# Patient Record
Sex: MALE | Race: WHITE | URBAN - NONMETROPOLITAN AREA
[De-identification: names, ages, dates, MRNs, and addresses within clinical notes are randomized per-mention and may not be internally consistent; named-entity substitution may affect disease eponyms.]

---

## 2020-03-13 ENCOUNTER — OFFICE VISIT (OUTPATIENT)
Dept: FAMILY MEDICINE CLINIC | Age: 25
End: 2020-03-13
Payer: COMMERCIAL

## 2020-03-13 VITALS
DIASTOLIC BLOOD PRESSURE: 60 MMHG | BODY MASS INDEX: 27.5 KG/M2 | WEIGHT: 203 LBS | HEIGHT: 72 IN | SYSTOLIC BLOOD PRESSURE: 120 MMHG | TEMPERATURE: 98.7 F | HEART RATE: 90 BPM | OXYGEN SATURATION: 97 %

## 2020-03-13 PROCEDURE — 99213 OFFICE O/P EST LOW 20 MIN: CPT | Performed by: PHYSICIAN ASSISTANT

## 2020-03-13 ASSESSMENT — ENCOUNTER SYMPTOMS
ALLERGIC/IMMUNOLOGIC NEGATIVE: 1
GASTROINTESTINAL NEGATIVE: 1
EYES NEGATIVE: 1
RESPIRATORY NEGATIVE: 1

## 2020-03-13 NOTE — PROGRESS NOTES
oropharyngeal erythema. Eyes:      Conjunctiva/sclera: Conjunctivae normal.      Pupils: Pupils are equal, round, and reactive to light. Neck:      Musculoskeletal: Normal range of motion and neck supple. No muscular tenderness. Cardiovascular:      Rate and Rhythm: Normal rate and regular rhythm. Pulses: Normal pulses. Heart sounds: Normal heart sounds. Pulmonary:      Effort: Pulmonary effort is normal. No respiratory distress. Breath sounds: Normal breath sounds. No stridor. No wheezing, rhonchi or rales. Chest:      Chest wall: No tenderness. Lymphadenopathy:      Cervical: No cervical adenopathy. Neurological:      Mental Status: He is alert. Assessment/Plan:  There are no diagnoses linked to this encounter. Pt. to follow up if PCP if no better 1 week.      Tessa Noonan PA-C

## 2021-10-29 ENCOUNTER — OFFICE VISIT (OUTPATIENT)
Dept: FAMILY MEDICINE CLINIC | Age: 26
End: 2021-10-29
Payer: COMMERCIAL

## 2021-10-29 VITALS
WEIGHT: 226 LBS | HEART RATE: 90 BPM | BODY MASS INDEX: 30.61 KG/M2 | RESPIRATION RATE: 16 BRPM | HEIGHT: 72 IN | OXYGEN SATURATION: 98 % | SYSTOLIC BLOOD PRESSURE: 122 MMHG | TEMPERATURE: 97.9 F | DIASTOLIC BLOOD PRESSURE: 76 MMHG

## 2021-10-29 DIAGNOSIS — R11.2 NON-INTRACTABLE VOMITING WITH NAUSEA, UNSPECIFIED VOMITING TYPE: ICD-10-CM

## 2021-10-29 DIAGNOSIS — R19.7 DIARRHEA, UNSPECIFIED TYPE: Primary | ICD-10-CM

## 2021-10-29 LAB
Lab: NORMAL
PERFORMING INSTRUMENT: NORMAL
QC PASS/FAIL: NORMAL
SARS-COV-2, POC: NORMAL

## 2021-10-29 PROCEDURE — G8427 DOCREV CUR MEDS BY ELIG CLIN: HCPCS | Performed by: NURSE PRACTITIONER

## 2021-10-29 PROCEDURE — G8484 FLU IMMUNIZE NO ADMIN: HCPCS | Performed by: NURSE PRACTITIONER

## 2021-10-29 PROCEDURE — G8417 CALC BMI ABV UP PARAM F/U: HCPCS | Performed by: NURSE PRACTITIONER

## 2021-10-29 PROCEDURE — 99214 OFFICE O/P EST MOD 30 MIN: CPT | Performed by: NURSE PRACTITIONER

## 2021-10-29 PROCEDURE — 1036F TOBACCO NON-USER: CPT | Performed by: NURSE PRACTITIONER

## 2021-10-29 PROCEDURE — 87426 SARSCOV CORONAVIRUS AG IA: CPT | Performed by: NURSE PRACTITIONER

## 2021-10-29 RX ORDER — GREEN TEA/HOODIA GORDONII 315-12.5MG
1 CAPSULE ORAL DAILY
Qty: 30 TABLET | Refills: 0 | Status: SHIPPED | OUTPATIENT
Start: 2021-10-29 | End: 2021-11-28

## 2021-10-29 RX ORDER — ONDANSETRON 4 MG/1
4 TABLET, ORALLY DISINTEGRATING ORAL EVERY 8 HOURS PRN
Qty: 20 TABLET | Refills: 0 | Status: SHIPPED
Start: 2021-10-29 | End: 2021-12-30 | Stop reason: ALTCHOICE

## 2021-10-29 NOTE — PATIENT INSTRUCTIONS
Patient Education        Diarrhea: Care Instructions  Your Care Instructions     Diarrhea is loose, watery stools (bowel movements). The exact cause is often hard to find. Sometimes diarrhea is your body's way of getting rid of what caused an upset stomach. Viruses, food poisoning, and many medicines can cause diarrhea. Some people get diarrhea in response to emotional stress, anxiety, or certain foods. Almost everyone has diarrhea now and then. It usually isn't serious, and your stools will return to normal soon. The important thing to do is replace the fluids you have lost, so you can prevent dehydration. The doctor has checked you carefully, but problems can develop later. If you notice any problems or new symptoms, get medical treatment right away. Follow-up care is a key part of your treatment and safety. Be sure to make and go to all appointments, and call your doctor if you are having problems. It's also a good idea to know your test results and keep a list of the medicines you take. How can you care for yourself at home? · Watch for signs of dehydration, which means your body has lost too much water. Dehydration is a serious condition and should be treated right away. Signs of dehydration are:  ? Increasing thirst and dry eyes and mouth. ? Feeling faint or lightheaded. ? A smaller amount of urine than normal.  · To prevent dehydration, drink plenty of fluids. Choose water and other clear liquids until you feel better. If you have kidney, heart, or liver disease and have to limit fluids, talk with your doctor before you increase the amount of fluids you drink. · When you feel like eating, start with small amounts of food. · The doctor may recommend that you take over-the-counter medicine, such as loperamide (Imodium), if you still have diarrhea after 6 hours. Read and follow all instructions on the label.  Do not use this medicine if you have bloody diarrhea, a high fever, or other signs of serious illness. Call your doctor if you think you are having a problem with your medicine. When should you call for help? Call 911 anytime you think you may need emergency care. For example, call if:    · You passed out (lost consciousness).     · Your stools are maroon or very bloody. Call your doctor now or seek immediate medical care if:    · You are dizzy or lightheaded, or you feel like you may faint.     · Your stools are black and look like tar, or they have streaks of blood.     · You have new or worse belly pain.     · You have symptoms of dehydration, such as:  ? Dry eyes and a dry mouth. ? Passing only a little urine. ? Feeling thirstier than usual.     · You have a new or higher fever. Watch closely for changes in your health, and be sure to contact your doctor if:    · Your diarrhea is getting worse.     · You see pus in the diarrhea.     · You are not getting better after 2 days (48 hours). Where can you learn more? Go to [email or web address].[email or web address]. org and sign in to your [employer] account. Enter K348 in the [employer] box to learn more about \"Diarrhea: Care Instructions. \"     If you do not have an account, please click on the \"Sign Up Now\" link. Current as of: July 1, 2021               Content Version: 13.0  © 5808-0816 [employer]. Care instructions adapted under license by [facility] ([facility]). If you have questions about a medical condition or this instruction, always ask your healthcare professional. [place] [address] any warranty or liability for your use of this information. Patient Education        Nausea and Vomiting: Care Instructions  Your Care Instructions     When you are nauseated, you may feel weak and sweaty and notice a lot of saliva in your mouth. Nausea often leads to vomiting. Most of the time you do not need to worry about nausea and vomiting, but they can be signs of other illnesses.   Two common causes of nausea and vomiting are stomach flu and food poisoning. Nausea and vomiting from viral stomach flu will usually start to improve within 24 hours. Nausea and vomiting from food poisoning may last from 12 to 48 hours. The doctor has checked you carefully, but problems can develop later. If you notice any problems or new symptoms, get medical treatment right away. Follow-up care is a key part of your treatment and safety. Be sure to make and go to all appointments, and call your doctor if you are having problems. It's also a good idea to know your test results and keep a list of the medicines you take. How can you care for yourself at home? · To prevent dehydration, drink plenty of fluids. Choose water and other clear liquids until you feel better. If you have kidney, heart, or liver disease and have to limit fluids, talk with your doctor before you increase the amount of fluids you drink. · Rest in bed until you feel better. · When you are able to eat, try clear soups, mild foods, and liquids until all symptoms are gone for 12 to 48 hours. Other good choices include dry toast, crackers, cooked cereal, and gelatin dessert, such as Jell-O. When should you call for help? Call 911 anytime you think you may need emergency care. For example, call if:    · You passed out (lost consciousness). Call your doctor now or seek immediate medical care if:    · You have symptoms of dehydration, such as:  ? Dry eyes and a dry mouth. ? Passing only a little urine. ? Feeling thirstier than usual.     · You have new or worsening belly pain.     · You have a new or higher fever.     · You vomit blood or what looks like coffee grounds. Watch closely for changes in your health, and be sure to contact your doctor if:    · You have ongoing nausea and vomiting.     · Your vomiting is getting worse.     · Your vomiting lasts longer than 2 days.     · You are not getting better as expected. Where can you learn more?   Go to

## 2021-10-29 NOTE — PROGRESS NOTES
10/29/21  Evelia Christy : 1995 Sex: male  Age 27 y.o. Subjective:  Chief Complaint   Patient presents with    Diarrhea     loose stool and vomiting all week. States there is blood in it today       HPI:   Evelia Christy , 27 y.o. male presents to the clinic for evaluation of intermittent nausea and vomiting x 4 days. The patient reports associated diarrhea. The patient reports intermittent abdominal cramping with diarrhea episodes. Patient reports today minimal amount of blood noted with wiping and in emesis. The patient has taken Pepto-Bismol for symptoms. The patient reports unchanged symptoms over time. The patient denies hematuria, coffee ground emesis, and black tarry stools. The patient also denies headache, fever, chest pain, abdominal pain, and shortness of breath. ROS:   Unless otherwise stated in this report the patient's positive and negative responses for review of systems for constitutional, eyes, ENT, cardiovascular, respiratory, gastrointestinal, neurological, , musculoskeletal, and integument systems and related systems to the presenting problem are either stated in the history of present illness or were not pertinent or were negative for the symptoms and/or complaints related to the presenting medical problem. Positives and pertinent negatives as per HPI. All others reviewed and are negative. PMH:   History reviewed. No pertinent past medical history. Past Surgical History:   Procedure Laterality Date    HERNIA REPAIR         History reviewed. No pertinent family history.     Medications:     Current Outpatient Medications:     Bismuth Subsalicylate (PEPTO-BISMOL PO), Take by mouth, Disp: , Rfl:     ondansetron (ZOFRAN-ODT) 4 MG disintegrating tablet, Place 1 tablet under the tongue every 8 hours as needed for Nausea or Vomiting, Disp: 20 tablet, Rfl: 0    Probiotic Acidophilus (FLORANEX) TABS, Take 1 tablet by mouth daily, Disp: 30 tablet, Rfl: 0    Allergies:   No Known Allergies    Social History:     Social History     Tobacco Use    Smoking status: Never Smoker    Smokeless tobacco: Never Used   Substance Use Topics    Alcohol use: Not on file    Drug use: Not on file       Patient lives at home. Physical Exam:     Vitals:    10/29/21 1331   BP: 122/76   Pulse: 90   Resp: 16   Temp: 97.9 °F (36.6 °C)   SpO2: 98%   Weight: 226 lb (102.5 kg)   Height: 6' (1.829 m)       Physical Exam (PE)   Constitutional: Alert, development consistent with age. HENT:      Head: Normocephalic. Right Ear: External ear normal.      Left Ear: External ear normal.      Nose: Normal.      Mouth/Throat:     Mouth: Mucous membranes are moist.      Pharynx: Oropharynx is clear. Eyes: Pupils: Pupils are equal, round, and reactive to light. Neck: Normal ROM. Supple. Cardiovascular: Heart RRR without pathologic murmurs or gallops. Pulmonary: Respiratory effort normal.  Normal breath sounds. Abdomen:  General Appearance: No obvious trauma or bruising. No rashes or lesions. Bowel sounds: BS+x4       Distension:  No distension. Tenderness: Mild generalized abdominal tenderness. Liver/Spleen: Non-tender and no hepatosplenomegaly. Pulsatile Mass: None noted. Back: CVA Tenderness: No bilateral tenderness or bruising. Skin:  Normal turgor. Warm, dry, without visible rash, unless noted elsewhere. Neurological:  Orientation age-appropriate. Motor functions intact. Testing:   (All laboratory and radiology results have been personally reviewed by myself)  Labs:  Results for orders placed or performed in visit on 10/29/21   POCT COVID-19, Antigen   Result Value Ref Range    SARS-COV-2, POC Not-Detected Not Detected    Lot Number [id number]     QC Pass/Fail pass     Performing Instrument BD Veritor        Imaging: All Radiology results interpreted by Radiologist unless otherwise noted.   No orders to display       Assessment / Plan:   The patient's vitals, allergies, medications, and past medical history have been reviewed. Lisa Grande was seen today for diarrhea. Diagnoses and all orders for this visit:    Diarrhea, unspecified type  -     POCT COVID-19, Antigen  -     Probiotic Acidophilus (FLORANEX) TABS; Take 1 tablet by mouth daily  -     Culture, Stool; Future    Non-intractable vomiting with nausea, unspecified vomiting type  -     POCT COVID-19, Antigen  -     ondansetron (ZOFRAN-ODT) 4 MG disintegrating tablet; Place 1 tablet under the tongue every 8 hours as needed for Nausea or Vomiting        - Disposition: Home    - Educational material printed for patient's review and were included in patient instructions. After Visit Summary and given to patient at the end of visit. - Patient declined rectal exam today. - Increase fluids and rest. Clear liquids progressing to [employer] as tolerated. Advise f/u with PCP in 3-5 days if symptoms persist. ED sooner if symptoms worsen or change. ED immediately with the development of high or refractory fever, severe or worsening abdominal pain, hematochezia, coffee-ground emesis, bloody stools, lethargy, CP, or SOB. Pt states understanding and is in agreement with this care plan. All questions answered. SIGNATURE: Alessio Gray APRN-CNP    *NOTE: This report was transcribed using voice recognition software. Every effort was made to ensure accuracy; however, inadvertent computerized transcription errors may be present.

## 2021-12-30 ENCOUNTER — OFFICE VISIT (OUTPATIENT)
Dept: FAMILY MEDICINE CLINIC | Age: 26
End: 2021-12-30
Payer: COMMERCIAL

## 2021-12-30 VITALS
SYSTOLIC BLOOD PRESSURE: 108 MMHG | RESPIRATION RATE: 18 BRPM | BODY MASS INDEX: 29.93 KG/M2 | TEMPERATURE: 98.6 F | WEIGHT: 221 LBS | DIASTOLIC BLOOD PRESSURE: 74 MMHG | HEIGHT: 72 IN | OXYGEN SATURATION: 96 % | HEART RATE: 114 BPM

## 2021-12-30 DIAGNOSIS — R05.9 COUGH: Primary | ICD-10-CM

## 2021-12-30 LAB
INFLUENZA A ANTIBODY: NORMAL
INFLUENZA B ANTIBODY: NORMAL
Lab: NORMAL
PERFORMING INSTRUMENT: NORMAL
QC PASS/FAIL: NORMAL
SARS-COV-2, POC: NORMAL

## 2021-12-30 PROCEDURE — G8417 CALC BMI ABV UP PARAM F/U: HCPCS | Performed by: FAMILY MEDICINE

## 2021-12-30 PROCEDURE — 1036F TOBACCO NON-USER: CPT | Performed by: FAMILY MEDICINE

## 2021-12-30 PROCEDURE — G8484 FLU IMMUNIZE NO ADMIN: HCPCS | Performed by: FAMILY MEDICINE

## 2021-12-30 PROCEDURE — 87804 INFLUENZA ASSAY W/OPTIC: CPT | Performed by: FAMILY MEDICINE

## 2021-12-30 PROCEDURE — 87426 SARSCOV CORONAVIRUS AG IA: CPT | Performed by: FAMILY MEDICINE

## 2021-12-30 PROCEDURE — G8427 DOCREV CUR MEDS BY ELIG CLIN: HCPCS | Performed by: FAMILY MEDICINE

## 2021-12-30 PROCEDURE — 99213 OFFICE O/P EST LOW 20 MIN: CPT | Performed by: FAMILY MEDICINE

## 2021-12-30 NOTE — PROGRESS NOTES
21  Ok Copping : 1995 Sex: male  Age: 27 y.o. Assessment and Plan:   Krishna Prado was seen today for fever, cough and generalized body aches. Diagnoses and all orders for this visit:    Cough  -     POCT COVID-19, Antigen  -     POCT Influenza A/B      Rapid flu negative   Rapid COVID negative  D/w pt possible need to retest given sx and/or exposure   Or has another viral syndrom   Given sx, encouraged pt to stay home as if positive   Updated [facility] quarantine recommendations reviewed  Supportive care reviewed   Seek urgent repeat eval with any worsening     Return if symptoms worsen or fail to improve. Chief Complaint   Patient presents with    Fever     Started Tuesday     Cough    Generalized Body Aches       HPI  Pt here for acute concerns  Started getting sick Tuesday   Had a fever, that resolved and he felt ok  Then he developed aches yesterday   Fever came back, up to 103  Chills  Body aches  +cough   Somewhat SOB  Has used tylenol which didn't help much   Trying to push fluids  He denies any loss of smell or taste but his nose is very plugged   Not vaccinated against COVID or flu     Problem list reviewed and updated in full with patient today as necessary. A comprehensive ROS was negative, except as documented above. No current outpatient medications on file. No Known Allergies    Pt's past medical and surgical history were reviewed and updated as necessary today   Pt's family and social history were reviewed and updated as necessary today          Vitals:    21 1101   BP: 108/74   Pulse: 114   Resp: 18   Temp: 98.6 °F (37 °C)   TempSrc: Temporal   SpO2: 96%   Weight: 221 lb (100.2 kg)   Height: 6' (1.829 m)       Physical Exam  Constitutional:       Appearance: Normal appearance. HENT:      Head: Normocephalic and atraumatic.       Right Ear: Tympanic membrane normal.      Left Ear: Tympanic membrane normal.      Mouth/Throat:      Mouth: Mucous membranes are moist. Pharynx: No oropharyngeal exudate. Eyes:      Conjunctiva/sclera: Conjunctivae normal.   Cardiovascular:      Rate and Rhythm: Regular rhythm. Tachycardia present. Heart sounds: Normal heart sounds. Pulmonary:      Effort: Pulmonary effort is normal.      Breath sounds: Normal breath sounds. Abdominal:      Tenderness: There is abdominal tenderness. Musculoskeletal:         General: Normal range of motion. Skin:     General: Skin is warm and dry. Neurological:      General: No focal deficit present. Mental Status: He is alert and oriented to person, place, and time. Psychiatric:         Mood and Affect: Mood normal.         Behavior: Behavior normal.          Counseled patient as appropriate and relevant regarding above diagnosis, including possible risks and complications, especially if left uncontrolled. Counseled patient as appropriate and relevant regarding any  possible side effects, risks, and alternatives to treatment; patient and/or guardian verbalizes understanding, and is in agreement with the plan as detailed above. Reviewed age and gender appropriate health screening exams and vaccinations. Advised patient regarding importance of keeping up with recommended health maintenance and to schedule as soon as possible if overdue, as this is important in assessing for undiagnosed pathology, especially cancer, as well as protecting against potentially harmful/life threatening disease. If discussed, any educational materials and/or home exercises printed for patient's review and were included in patient instructions on his/her After Visit Summary and given to patient at the end of visit. Advised patient to call with any new medication issues, and and other concerns/complaints prior to scheduled follow up. All questions answered to the patient's satisfaction.         Seen By:  Danya Cash MD

## 2024-10-31 ENCOUNTER — OFFICE VISIT (OUTPATIENT)
Dept: FAMILY MEDICINE CLINIC | Age: 29
End: 2024-10-31
Payer: COMMERCIAL

## 2024-10-31 VITALS
OXYGEN SATURATION: 97 % | DIASTOLIC BLOOD PRESSURE: 78 MMHG | TEMPERATURE: 97.3 F | SYSTOLIC BLOOD PRESSURE: 122 MMHG | BODY MASS INDEX: 29.93 KG/M2 | HEART RATE: 103 BPM | HEIGHT: 72 IN | WEIGHT: 221 LBS | RESPIRATION RATE: 18 BRPM

## 2024-10-31 DIAGNOSIS — J32.9 SINOBRONCHITIS: Primary | ICD-10-CM

## 2024-10-31 DIAGNOSIS — J40 SINOBRONCHITIS: Primary | ICD-10-CM

## 2024-10-31 DIAGNOSIS — R05.1 ACUTE COUGH: ICD-10-CM

## 2024-10-31 DIAGNOSIS — R50.9 FEVER, UNSPECIFIED FEVER CAUSE: ICD-10-CM

## 2024-10-31 PROCEDURE — G8427 DOCREV CUR MEDS BY ELIG CLIN: HCPCS | Performed by: NURSE PRACTITIONER

## 2024-10-31 PROCEDURE — 99213 OFFICE O/P EST LOW 20 MIN: CPT | Performed by: NURSE PRACTITIONER

## 2024-10-31 PROCEDURE — G8419 CALC BMI OUT NRM PARAM NOF/U: HCPCS | Performed by: NURSE PRACTITIONER

## 2024-10-31 PROCEDURE — G8484 FLU IMMUNIZE NO ADMIN: HCPCS | Performed by: NURSE PRACTITIONER

## 2024-10-31 PROCEDURE — 1036F TOBACCO NON-USER: CPT | Performed by: NURSE PRACTITIONER

## 2024-10-31 RX ORDER — DOXYCYCLINE HYCLATE 100 MG
100 TABLET ORAL 2 TIMES DAILY
Qty: 14 TABLET | Refills: 0 | Status: SHIPPED | OUTPATIENT
Start: 2024-10-31 | End: 2024-11-07

## 2024-10-31 RX ORDER — METHYLPREDNISOLONE 4 MG/1
TABLET ORAL
Qty: 1 KIT | Refills: 0 | Status: SHIPPED | OUTPATIENT
Start: 2024-10-31

## 2024-10-31 RX ORDER — ALBUTEROL SULFATE 90 UG/1
2 INHALANT RESPIRATORY (INHALATION) 4 TIMES DAILY PRN
Qty: 18 G | Refills: 0 | Status: SHIPPED | OUTPATIENT
Start: 2024-10-31

## 2024-10-31 NOTE — PROGRESS NOTES
shaking chills, vomiting, abdominal pain, lethargy, flank pain, or decreased urinary output. Pt verbalizes understanding and is in agreement with plan of care. All questions answered.    EMILEE Mcnally NP    *NOTE: This report was transcribed using voice recognition software. Every effort was made to ensure accuracy; however, inadvertent computerized transcription errors may be present.    Addendum: Chest x-ray is negative for pneumonia.  Symptoms are consistent with bronchitis.  Prescription sent to the pharmacy for doxycycline, albuterol inhaler and Medrol Dosepak.  If symptoms worsen or progress he should go to the ER.  Electronically signed by EMILEE Mcnally NP on 10/31/2024 at 10:37 AM